# Patient Record
Sex: MALE | Race: OTHER | NOT HISPANIC OR LATINO | URBAN - METROPOLITAN AREA
[De-identification: names, ages, dates, MRNs, and addresses within clinical notes are randomized per-mention and may not be internally consistent; named-entity substitution may affect disease eponyms.]

---

## 2023-04-06 ENCOUNTER — EMERGENCY (EMERGENCY)
Facility: HOSPITAL | Age: 33
LOS: 1 days | Discharge: HOME | End: 2023-04-06
Attending: EMERGENCY MEDICINE | Admitting: EMERGENCY MEDICINE
Payer: MEDICAID

## 2023-04-06 VITALS
SYSTOLIC BLOOD PRESSURE: 138 MMHG | DIASTOLIC BLOOD PRESSURE: 78 MMHG | OXYGEN SATURATION: 100 % | TEMPERATURE: 98 F | RESPIRATION RATE: 20 BRPM | HEART RATE: 81 BPM

## 2023-04-06 VITALS
TEMPERATURE: 98 F | HEART RATE: 79 BPM | RESPIRATION RATE: 24 BRPM | DIASTOLIC BLOOD PRESSURE: 85 MMHG | OXYGEN SATURATION: 100 % | SYSTOLIC BLOOD PRESSURE: 124 MMHG

## 2023-04-06 LAB
ALBUMIN SERPL ELPH-MCNC: 4.5 G/DL — SIGNIFICANT CHANGE UP (ref 3.3–5)
ALP SERPL-CCNC: 64 U/L — SIGNIFICANT CHANGE UP (ref 40–120)
ALT FLD-CCNC: 35 U/L — SIGNIFICANT CHANGE UP (ref 4–41)
ANION GAP SERPL CALC-SCNC: 12 MMOL/L — SIGNIFICANT CHANGE UP (ref 7–14)
AST SERPL-CCNC: 22 U/L — SIGNIFICANT CHANGE UP (ref 4–40)
BASOPHILS # BLD AUTO: 0.01 K/UL — SIGNIFICANT CHANGE UP (ref 0–0.2)
BASOPHILS NFR BLD AUTO: 0.2 % — SIGNIFICANT CHANGE UP (ref 0–2)
BILIRUB SERPL-MCNC: 0.8 MG/DL — SIGNIFICANT CHANGE UP (ref 0.2–1.2)
BUN SERPL-MCNC: 8 MG/DL — SIGNIFICANT CHANGE UP (ref 7–23)
CALCIUM SERPL-MCNC: 9.5 MG/DL — SIGNIFICANT CHANGE UP (ref 8.4–10.5)
CHLORIDE SERPL-SCNC: 106 MMOL/L — SIGNIFICANT CHANGE UP (ref 98–107)
CO2 SERPL-SCNC: 24 MMOL/L — SIGNIFICANT CHANGE UP (ref 22–31)
CREAT SERPL-MCNC: 0.86 MG/DL — SIGNIFICANT CHANGE UP (ref 0.5–1.3)
D DIMER BLD IA.RAPID-MCNC: <150 NG/ML DDU — SIGNIFICANT CHANGE UP
EGFR: 117 ML/MIN/1.73M2 — SIGNIFICANT CHANGE UP
EOSINOPHIL # BLD AUTO: 0.1 K/UL — SIGNIFICANT CHANGE UP (ref 0–0.5)
EOSINOPHIL NFR BLD AUTO: 2.3 % — SIGNIFICANT CHANGE UP (ref 0–6)
GLUCOSE SERPL-MCNC: 91 MG/DL — SIGNIFICANT CHANGE UP (ref 70–99)
HCT VFR BLD CALC: 44.7 % — SIGNIFICANT CHANGE UP (ref 39–50)
HGB BLD-MCNC: 15 G/DL — SIGNIFICANT CHANGE UP (ref 13–17)
IANC: 2.26 K/UL — SIGNIFICANT CHANGE UP (ref 1.8–7.4)
IMM GRANULOCYTES NFR BLD AUTO: 0.5 % — SIGNIFICANT CHANGE UP (ref 0–0.9)
LYMPHOCYTES # BLD AUTO: 1.6 K/UL — SIGNIFICANT CHANGE UP (ref 1–3.3)
LYMPHOCYTES # BLD AUTO: 36.2 % — SIGNIFICANT CHANGE UP (ref 13–44)
MCHC RBC-ENTMCNC: 28 PG — SIGNIFICANT CHANGE UP (ref 27–34)
MCHC RBC-ENTMCNC: 33.6 GM/DL — SIGNIFICANT CHANGE UP (ref 32–36)
MCV RBC AUTO: 83.4 FL — SIGNIFICANT CHANGE UP (ref 80–100)
MONOCYTES # BLD AUTO: 0.43 K/UL — SIGNIFICANT CHANGE UP (ref 0–0.9)
MONOCYTES NFR BLD AUTO: 9.7 % — SIGNIFICANT CHANGE UP (ref 2–14)
NEUTROPHILS # BLD AUTO: 2.26 K/UL — SIGNIFICANT CHANGE UP (ref 1.8–7.4)
NEUTROPHILS NFR BLD AUTO: 51.1 % — SIGNIFICANT CHANGE UP (ref 43–77)
NRBC # BLD: 0 /100 WBCS — SIGNIFICANT CHANGE UP (ref 0–0)
NRBC # FLD: 0 K/UL — SIGNIFICANT CHANGE UP (ref 0–0)
PLATELET # BLD AUTO: 198 K/UL — SIGNIFICANT CHANGE UP (ref 150–400)
POTASSIUM SERPL-MCNC: 4 MMOL/L — SIGNIFICANT CHANGE UP (ref 3.5–5.3)
POTASSIUM SERPL-SCNC: 4 MMOL/L — SIGNIFICANT CHANGE UP (ref 3.5–5.3)
PROT SERPL-MCNC: 7.3 G/DL — SIGNIFICANT CHANGE UP (ref 6–8.3)
RBC # BLD: 5.36 M/UL — SIGNIFICANT CHANGE UP (ref 4.2–5.8)
RBC # FLD: 11.9 % — SIGNIFICANT CHANGE UP (ref 10.3–14.5)
SODIUM SERPL-SCNC: 142 MMOL/L — SIGNIFICANT CHANGE UP (ref 135–145)
TROPONIN T, HIGH SENSITIVITY RESULT: <6 NG/L — SIGNIFICANT CHANGE UP
WBC # BLD: 4.42 K/UL — SIGNIFICANT CHANGE UP (ref 3.8–10.5)
WBC # FLD AUTO: 4.42 K/UL — SIGNIFICANT CHANGE UP (ref 3.8–10.5)

## 2023-04-06 PROCEDURE — 99285 EMERGENCY DEPT VISIT HI MDM: CPT

## 2023-04-06 PROCEDURE — 71046 X-RAY EXAM CHEST 2 VIEWS: CPT | Mod: 26

## 2023-04-06 RX ORDER — ALBUTEROL 90 UG/1
1 AEROSOL, METERED ORAL ONCE
Refills: 0 | Status: COMPLETED | OUTPATIENT
Start: 2023-04-06 | End: 2023-04-06

## 2023-04-06 RX ORDER — IPRATROPIUM/ALBUTEROL SULFATE 18-103MCG
3 AEROSOL WITH ADAPTER (GRAM) INHALATION ONCE
Refills: 0 | Status: COMPLETED | OUTPATIENT
Start: 2023-04-06 | End: 2023-04-06

## 2023-04-06 RX ADMIN — Medication 3 MILLILITER(S): at 17:07

## 2023-04-06 RX ADMIN — ALBUTEROL 1 PUFF(S): 90 AEROSOL, METERED ORAL at 18:33

## 2023-04-06 NOTE — ED ADULT NURSE NOTE - OBJECTIVE STATEMENT
received pt in intake room 5, 32 yr/o male A+OX4, ambulatory at baseline. denies significant PMH. pt presented to the ED C/O dyspnia on exertion and intermittent chest pain for 2 weeks. pt states he was running outside on a cold day and has been experiencing symptoms since. states he does not have pain a this time. VSS, RR even and unlabored. B/L clear lung sounds noted. left AC 20g placed, labs drawn and sent. meds given as ordered. no acute resp distress noted at this time.

## 2023-04-06 NOTE — ED PROVIDER NOTE - CLINICAL SUMMARY MEDICAL DECISION MAKING FREE TEXT BOX
Lucy KRISHNAN: 31 yo M with no chronic medical problems here for chest heaviness and MOFFETT. Exam with clear lungs, no calf tenderness or leg swelling. He had an outpatient work up that included blood work that was normal about 1 week ago. Symptoms started after jogging outside. Although, no wheezing on exam, will give albuterol to see if that helps patient. Plan for labs including troponin and d-dimer. EKG reviewed and is non-ischemic. Differential includes PE, ACS, RAD. Lucy KRISHNAN: 31 yo M with no chronic medical problems here for chest heaviness and MOFFETT. Exam with clear lungs, no calf tenderness or leg swelling. He had an outpatient work up that included blood work that was normal about 1 week ago. Symptoms started after jogging outside. Although, no wheezing on exam, will give albuterol to see if that helps patient. Plan for labs including troponin and d-dimer. EKG reviewed and is non-ischemic. Differential includes PE, ACS, RAD.    See progress note for updates.

## 2023-04-06 NOTE — ED PROVIDER NOTE - OBJECTIVE STATEMENT
Lucy KRISHNAN: Patient is a 32-year-old male with no chronic medical problems here for evaluation of shortness of breath and dyspnea on exertion x2 weeks, worse in the past 2 to 3 days.  Patient speaks Michael/Nanette and I was able to speak to him directly.  Patient reports that he went for a jog about 2 weeks ago and after that he started to feel shortness of breath.  He denies any history of asthma.  He denies any history of environmental allergies.  He denies any prior similar episodes.  No fevers, chills, cough, nausea, vomiting, diarrhea.  He denies any recent illnesses.  He denies any recent travel.  He works as a .  He denies any leg swelling or calf tenderness.  He states he went to his primary care physician last week and had blood test done that were normal.  Patient also reports that he has gained some weight recently.  He denies any night sweats.  No rashes.  He complains of having a heavy feeling in his chest.  He also states when he takes of breath he has pain on both sides of his chest.

## 2023-04-06 NOTE — ED PROVIDER NOTE - NS ED ATTENDING STATEMENT MOD
I have seen and examined this patient and fully participated in the care of this patient as the teaching attending.  The service was shared with the OLGA.  I reviewed and verified the documentation and independently performed the documented:

## 2023-04-06 NOTE — ED PROVIDER NOTE - CPE EDP CARDIAC NORM
July 5, 2020        Lindsay Burch  [address]  [place] [address]    This letter provides a written record that you were tested for COVID-19 on 7/1/20.       Your result was negative. This means that we didn t find the virus that causes COVID-19 in your sample. A test may show negative when you do actually have the virus. This can happen when the virus is in the early stages of infection, before you feel illness symptoms.    If you have symptoms   Stay home and away from others (self-isolate) until you meet ALL of the guidelines below:    You ve had no fever--and no medicine that reduces fever--for 3 full days (72 hours). And      Your other symptoms have gotten better. For example, your cough or breathing has improved. And     At least 10 days have passed since your symptoms started.    During this time:    Stay home. Don t go to work, school or anywhere else.     Stay in your own room, including for meals. Use your own bathroom if you can.    Stay away from others in your home. No hugging, kissing or shaking hands. No visitors.    Clean  high touch  surfaces often (doorknobs, counters, handles, etc.). Use a household cleaning spray or wipes. You can find a full list on the EPA website at www.epa.gov/pesticide-registration/list-n-disinfectants-use-against-sars-cov-2.    Cover your mouth and nose with a mask, tissue or washcloth to avoid spreading germs.    Wash your hands and face often with soap and water.    Going back to work  Check with your employer for any guidelines to follow for going back to work.    Employers: This document serves as formal notice that your employee tested negative for COVID-19, as of the testing date shown above.     normal...

## 2023-04-06 NOTE — ED PROVIDER NOTE - PROGRESS NOTE DETAILS
JW Mclaughlin: D-dimer negative, troponin less than 6, chest x-ray with clear lungs.  Patient reassessed and reports improvement in symptoms following nebulizer.  Will provide albuterol inhaler for patient to use at home.  Recommend primary care follow-up within 1 week.  Patient will return to the ER with any worsening or concerning symptoms.

## 2023-04-06 NOTE — ED PROVIDER NOTE - NSFOLLOWUPINSTRUCTIONS_ED_ALL_ED_FT
Follow-up with your primary care doctor within 1 week.  Use albuterol inhaler 2 puffs every 4-6 hours as needed for shortness of breath.  Return to the ER with any worsening or concerning symptoms, worsening shortness of breath, chest pain, feeling faint, dizziness, weakness or any other concerns.

## 2024-01-17 NOTE — ED PROVIDER NOTE - GASTROINTESTINAL NEGATIVE STATEMENT, MLM
Fellow Fellow no abdominal pain, no bloating, no constipation, no diarrhea, no nausea and no vomiting.

## 2024-03-20 ENCOUNTER — EMERGENCY (EMERGENCY)
Facility: HOSPITAL | Age: 34
LOS: 1 days | Discharge: HOME | End: 2024-03-20
Admitting: EMERGENCY MEDICINE
Payer: COMMERCIAL

## 2024-03-20 VITALS
RESPIRATION RATE: 15 BRPM | OXYGEN SATURATION: 99 % | TEMPERATURE: 98 F | SYSTOLIC BLOOD PRESSURE: 150 MMHG | HEART RATE: 78 BPM | DIASTOLIC BLOOD PRESSURE: 95 MMHG

## 2024-03-20 VITALS
DIASTOLIC BLOOD PRESSURE: 77 MMHG | SYSTOLIC BLOOD PRESSURE: 156 MMHG | OXYGEN SATURATION: 100 % | HEART RATE: 68 BPM | TEMPERATURE: 98 F | RESPIRATION RATE: 16 BRPM

## 2024-03-20 PROCEDURE — 99283 EMERGENCY DEPT VISIT LOW MDM: CPT

## 2024-03-20 RX ORDER — HYDROCORTISONE 1 %
1 OINTMENT (GRAM) TOPICAL
Qty: 1 | Refills: 0
Start: 2024-03-20 | End: 2024-03-26

## 2024-03-20 NOTE — ED PROVIDER NOTE - PHYSICAL EXAMINATION
Gen: Well appearing in NAD  Head: NC/AT  Neck: trachea midline  Resp:  No distress  Ext: no deformities  Neuro:  A&O appears non focal  Skin:  Warm and dry as visualized  Psych:  Normal affect and mood     rectal exam  chaperone RN Aly   no swelling or external hemorhoid appreciated - mild erythema - no fissue or active bleeding  BG: + mild TTP - no mass palpated

## 2024-03-20 NOTE — ED ADULT NURSE NOTE - OBJECTIVE STATEMENT
pt. seen, evaluated and discharged by JW Eisenberg prior to writer assessing patient. No nursing interventions needed at this time. NAD noted. respirations even and unlabored on RA. DC VS as noted.

## 2024-03-20 NOTE — ED PROVIDER NOTE - CLINICAL SUMMARY MEDICAL DECISION MAKING FREE TEXT BOX
34 y/o male c/o rectal pain x 1 week worseing over past 3 days  -unlcear etiology - possible internal hemmorhoid vs generalized irritation  -no urgent imaging needed in ER  -will try anusol cream for inflammation  -outpt colorectal follow up prn

## 2024-03-20 NOTE — ED ADULT NURSE NOTE - CAS DISCH CONDITION
Denies suicidal ideation  States he wants rehab for his heroin use  He states he got "inpatient" with his last rehab stay and left 
Stable

## 2024-03-20 NOTE — ED PROVIDER NOTE - PATIENT PORTAL LINK FT
You can access the FollowMyHealth Patient Portal offered by [facility] by registering at the following website: [email or web address]. By joining [employer]’s FollowMyHealth portal, you will also be able to view your health information using other applications (apps) compatible with our system.

## 2024-03-20 NOTE — ED PROVIDER NOTE - OBJECTIVE STATEMENT
33-year-old male no past medical history presents emergency room complaining of rectal pain x 1 week.  Patient states over the past week has had mild pain and irritation to rectal area states over the last 3 days pain has gotten worse.  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  Patient states when he puts finger over area at times he feels swelling and states pain is worse when standing up.  Patient states able to have bowel movements normally and states no pain with bowel movements to area.  Denies blood in stool denies constipation denies increase straining.  Denies previous history of rectal pain in the past.

## 2024-03-20 NOTE — ED PROVIDER NOTE - NSFOLLOWUPINSTRUCTIONS_ED_ALL_ED_FT
Rectal Pain    WHAT YOU NEED TO KNOW:    Rectal pain can be caused by a number of conditions, such as hemorrhoids, an abscess, trauma, or anal tear. Infection, muscle spasms, or anal intercourse can also cause rectal pain.     DISCHARGE INSTRUCTIONS:    Medicines: You may need any of the following:     NSAIDs, such as ibuprofen, help decrease swelling, pain, and fever. This medicine is available with or without a doctor's order. NSAIDs can cause stomach bleeding or kidney problems in certain people. If you take blood thinner medicine, always ask your healthcare provider if NSAIDs are safe for you. Always read the medicine label and follow directions.    Prescription pain medicine may be given. Ask how to take this medicine safely.    Antibiotics help treat or prevent a bacterial infection.    Bowel movement softeners help soften your bowel movement. They help prevent straining and more damage to the area.    Take your medicine as directed. Contact your healthcare provider if you think your medicine is not helping or if you have side effects. Tell him or her if you are allergic to any medicine. Keep a list of the medicines, vitamins, and herbs you take. Include the amounts, and when and why you take them. Bring the list or the pill bottles to follow-up visits. Carry your medicine list with you in case of an emergency.    Return to the emergency department if:   You have severe pain.    Contact your healthcare provider if:   Your pain does not decrease after 1 to 2 days of treatment.  You cannot take the medicine prescribed for your condition.  You have questions or concerns about your condition or care.    Take a sitz bath: Fill a bathtub with 4 to 6 inches of warm water. You may also use a sitz bath pan that fits over a toilet. Sit in the sitz bath for 20 minutes. Do this 2 to 3 times a day, or as directed. The warm water can help decrease pain, muscle spasms, or swelling.     Apply heat: Apply a warm, moist compress on your anus for 20 to 30 minutes every 2 hours for as many days as directed. Heat helps decrease pain and muscle spasms.    Eat high-fiber foods: This will help prevent constipation and soften your bowel movements. High-fiber foods include fruit, vegetables, whole-grain breads and cereals, and beans. A dietitian or healthcare provider can help you create a high-fiber meal plan.     Drink liquids as directed: You may need to drink more liquid than usual to help soften your bowel movements. Ask how much liquid to drink each day and which liquids are best for you.     Follow up with your healthcare provider as directed: Write down your questions so you remember to ask them during your visits.

## 2024-03-21 PROBLEM — Z78.9 OTHER SPECIFIED HEALTH STATUS: Chronic | Status: ACTIVE | Noted: 2023-04-06

## 2024-09-09 NOTE — ED PROVIDER NOTE - PATIENT PORTAL LINK FT
Statement Selected
You can access the FollowMyHealth Patient Portal offered by [facility] by registering at the following website: [email or web address]. By joining [employer]’s FollowMyHealth portal, you will also be able to view your health information using other applications (apps) compatible with our system.